# Patient Record
Sex: MALE | Race: WHITE | NOT HISPANIC OR LATINO | URBAN - METROPOLITAN AREA
[De-identification: names, ages, dates, MRNs, and addresses within clinical notes are randomized per-mention and may not be internally consistent; named-entity substitution may affect disease eponyms.]

---

## 2017-08-01 ENCOUNTER — COMMUNICATION - HEALTHEAST (OUTPATIENT)
Dept: FAMILY MEDICINE | Facility: CLINIC | Age: 47
End: 2017-08-01

## 2017-08-07 ENCOUNTER — OFFICE VISIT - HEALTHEAST (OUTPATIENT)
Dept: FAMILY MEDICINE | Facility: CLINIC | Age: 47
End: 2017-08-07

## 2017-08-07 DIAGNOSIS — R59.0 CERVICAL LYMPHADENOPATHY: ICD-10-CM

## 2017-08-07 DIAGNOSIS — B00.1 HERPES LABIALIS: ICD-10-CM

## 2017-08-15 ENCOUNTER — OFFICE VISIT - HEALTHEAST (OUTPATIENT)
Dept: FAMILY MEDICINE | Facility: CLINIC | Age: 47
End: 2017-08-15

## 2017-08-15 ENCOUNTER — HOSPITAL ENCOUNTER (OUTPATIENT)
Dept: CT IMAGING | Facility: CLINIC | Age: 47
Discharge: HOME | End: 2017-08-15
Attending: FAMILY MEDICINE

## 2017-08-15 DIAGNOSIS — N40.0 BPH (BENIGN PROSTATIC HYPERPLASIA): ICD-10-CM

## 2017-08-15 DIAGNOSIS — B00.9 HERPES SIMPLEX VIRUS (HSV) INFECTION: ICD-10-CM

## 2017-08-15 DIAGNOSIS — E78.5 HYPERLIPIDEMIA: ICD-10-CM

## 2017-08-15 DIAGNOSIS — R22.1 LOCALIZED SWELLING, MASS AND LUMP, NECK: ICD-10-CM

## 2017-08-15 DIAGNOSIS — E66.3 OVERWEIGHT: ICD-10-CM

## 2017-08-15 DIAGNOSIS — Z00.00 PHYSICAL EXAM: ICD-10-CM

## 2017-08-15 LAB
CHOLEST SERPL-MCNC: 238 MG/DL
FASTING STATUS PATIENT QL REPORTED: YES
HDLC SERPL-MCNC: 49 MG/DL
LDLC SERPL CALC-MCNC: 165 MG/DL
TRIGL SERPL-MCNC: 121 MG/DL

## 2017-08-15 RX ORDER — VALACYCLOVIR HYDROCHLORIDE 1 G/1
TABLET, FILM COATED ORAL
Qty: 30 TABLET | Refills: 1 | Status: SHIPPED | OUTPATIENT
Start: 2017-08-15

## 2017-08-15 ASSESSMENT — MIFFLIN-ST. JEOR: SCORE: 1707.88

## 2017-08-16 ENCOUNTER — COMMUNICATION - HEALTHEAST (OUTPATIENT)
Dept: FAMILY MEDICINE | Facility: CLINIC | Age: 47
End: 2017-08-16

## 2017-08-16 ENCOUNTER — AMBULATORY - HEALTHEAST (OUTPATIENT)
Dept: FAMILY MEDICINE | Facility: CLINIC | Age: 47
End: 2017-08-16

## 2017-08-16 DIAGNOSIS — R22.1 NECK MASS: ICD-10-CM

## 2017-09-21 ENCOUNTER — OFFICE VISIT - HEALTHEAST (OUTPATIENT)
Dept: OTOLARYNGOLOGY | Facility: CLINIC | Age: 47
End: 2017-09-21

## 2017-09-21 DIAGNOSIS — R59.1 LYMPHADENOPATHY: ICD-10-CM

## 2017-09-21 ASSESSMENT — MIFFLIN-ST. JEOR: SCORE: 1707.88

## 2017-09-26 ENCOUNTER — OFFICE VISIT - HEALTHEAST (OUTPATIENT)
Dept: FAMILY MEDICINE | Facility: CLINIC | Age: 47
End: 2017-09-26

## 2017-09-26 DIAGNOSIS — N40.0 BPH (BENIGN PROSTATIC HYPERPLASIA): ICD-10-CM

## 2017-09-26 DIAGNOSIS — Z01.818 PREOP EXAMINATION: ICD-10-CM

## 2017-09-26 DIAGNOSIS — E78.5 HYPERLIPIDEMIA: ICD-10-CM

## 2017-09-26 ASSESSMENT — MIFFLIN-ST. JEOR: SCORE: 1752.11

## 2017-10-23 ENCOUNTER — RECORDS - HEALTHEAST (OUTPATIENT)
Dept: ADMINISTRATIVE | Facility: OTHER | Age: 47
End: 2017-10-23

## 2017-10-23 ENCOUNTER — SURGERY - HEALTHEAST (OUTPATIENT)
Dept: SURGERY | Facility: CLINIC | Age: 47
End: 2017-10-23

## 2017-10-23 ENCOUNTER — ANESTHESIA - HEALTHEAST (OUTPATIENT)
Dept: SURGERY | Facility: CLINIC | Age: 47
End: 2017-10-23

## 2017-10-23 ASSESSMENT — MIFFLIN-ST. JEOR: SCORE: 1705.39

## 2017-10-26 ENCOUNTER — COMMUNICATION - HEALTHEAST (OUTPATIENT)
Dept: OTOLARYNGOLOGY | Facility: CLINIC | Age: 47
End: 2017-10-26

## 2017-10-26 DIAGNOSIS — C77.0 METASTASIS TO CERVICAL LYMPH NODE (H): ICD-10-CM

## 2017-11-01 ENCOUNTER — HOSPITAL ENCOUNTER (OUTPATIENT)
Dept: PET IMAGING | Facility: HOSPITAL | Age: 47
Discharge: HOME | End: 2017-11-01
Attending: OTOLARYNGOLOGY

## 2017-11-01 DIAGNOSIS — C77.0 METASTASIS TO CERVICAL LYMPH NODE (H): ICD-10-CM

## 2017-11-02 ENCOUNTER — HOSPITAL ENCOUNTER (OUTPATIENT)
Dept: PET IMAGING | Facility: HOSPITAL | Age: 47
Setting detail: RADIATION/ONCOLOGY SERIES
Discharge: STILL ADMITTED OR ADMINISTRATIVE | End: 2017-11-02
Attending: OTOLARYNGOLOGY

## 2017-11-02 ENCOUNTER — COMMUNICATION - HEALTHEAST (OUTPATIENT)
Dept: OTOLARYNGOLOGY | Facility: CLINIC | Age: 47
End: 2017-11-02

## 2017-11-02 DIAGNOSIS — C09.9 SQUAMOUS CELL CARCINOMA OF LEFT TONSIL (H): ICD-10-CM

## 2017-11-02 ASSESSMENT — MIFFLIN-ST. JEOR: SCORE: 1733.96

## 2017-11-03 ENCOUNTER — COMMUNICATION - HEALTHEAST (OUTPATIENT)
Dept: ONCOLOGY | Facility: HOSPITAL | Age: 47
End: 2017-11-03

## 2017-11-06 ENCOUNTER — COMMUNICATION - HEALTHEAST (OUTPATIENT)
Dept: ONCOLOGY | Facility: HOSPITAL | Age: 47
End: 2017-11-06

## 2017-11-14 ENCOUNTER — AMBULATORY - HEALTHEAST (OUTPATIENT)
Dept: ONCOLOGY | Facility: HOSPITAL | Age: 47
End: 2017-11-14

## 2017-11-14 ENCOUNTER — AMBULATORY - HEALTHEAST (OUTPATIENT)
Dept: INFUSION THERAPY | Facility: HOSPITAL | Age: 47
End: 2017-11-14

## 2017-11-14 ENCOUNTER — OFFICE VISIT - HEALTHEAST (OUTPATIENT)
Dept: ONCOLOGY | Facility: HOSPITAL | Age: 47
End: 2017-11-14

## 2017-11-14 DIAGNOSIS — C09.9 TONSIL CANCER (H): ICD-10-CM

## 2017-11-14 ASSESSMENT — MIFFLIN-ST. JEOR: SCORE: 1726.02

## 2017-11-16 ENCOUNTER — COMMUNICATION - HEALTHEAST (OUTPATIENT)
Dept: ONCOLOGY | Facility: HOSPITAL | Age: 47
End: 2017-11-16

## 2017-11-28 ENCOUNTER — COMMUNICATION - HEALTHEAST (OUTPATIENT)
Dept: ADMINISTRATIVE | Facility: HOSPITAL | Age: 47
End: 2017-11-28

## 2021-05-31 VITALS — HEIGHT: 73 IN | WEIGHT: 179 LBS | BODY MASS INDEX: 23.72 KG/M2

## 2021-05-31 VITALS — WEIGHT: 176.7 LBS | BODY MASS INDEX: 23.42 KG/M2 | HEIGHT: 73 IN

## 2021-05-31 VITALS — BODY MASS INDEX: 23.72 KG/M2 | HEIGHT: 73 IN | WEIGHT: 179 LBS

## 2021-05-31 VITALS — WEIGHT: 187 LBS | BODY MASS INDEX: 24.78 KG/M2 | HEIGHT: 73 IN

## 2021-05-31 VITALS — WEIGHT: 183 LBS | HEIGHT: 73 IN | BODY MASS INDEX: 24.25 KG/M2

## 2021-05-31 VITALS — WEIGHT: 184 LBS

## 2021-05-31 VITALS — BODY MASS INDEX: 24.25 KG/M2 | WEIGHT: 183 LBS | HEIGHT: 73 IN

## 2021-06-02 ENCOUNTER — RECORDS - HEALTHEAST (OUTPATIENT)
Dept: ADMINISTRATIVE | Facility: CLINIC | Age: 51
End: 2021-06-02

## 2021-06-12 NOTE — PROGRESS NOTES
ASSESSMENT:  1. Physical exam  Patient overall has good health habits.    2. Hyperlipidemia  Mild elevation of cholesterol  - Lipid Cascade  - Comprehensive Metabolic Panel    3. BPH (benign prostatic hyperplasia)  Nocturia, symptoms stable, watchful waiting.    4. Herpes Simplex  Occasional cold sore     5. Localized swelling, mass and lump, neck  8 months of intermittent recurrent swelling left lateral neck, asymptomatic.  Etiologies include a lipoma benign cyst lymph node among many other possibilities.  - CT Soft Tissue Neck With Contrast; Future      PLAN:  1.  CT scan soft tissue neck and proceed accordingly.  2.  Laboratory studies as above.  3.  Valtrex renew  4.  Focus as much as possible in good diet and exercise.  5.  Patient should otherwise be seen yearly.    Orders Placed This Encounter   Procedures     CT Soft Tissue Neck With Contrast     Standing Status:   Future     Standing Expiration Date:   8/16/2018     Scheduling Instructions:      SCHEDULING: Will patient schedule appointment? Yes     LOCATION: [facility]                  LOCATIONS:        - [facility] ([facility]), PHONE: [phone]      - [facility] ([facility]), PHONE: [phone]     Order Specific Question:   Can the procedure be changed per Radiologist protocol?     Answer:   Yes     Lipid Cascade     Order Specific Question:   Fasting is required?     Answer:   Yes     Comprehensive Metabolic Panel     Medications Discontinued During This Encounter   Medication Reason     valACYclovir (VALTREX) 1000 MG tablet Reorder       No Follow-up on file.    CHIEF COMPLAINT:  Chief Complaint   Patient presents with     Annual Exam     pt is fasting - talk about results of swollen glands        SUBJECTIVE:  Alistair is a 46 y.o. male who comes in for physical examination.  Since about December of last year the patient has noticed a swelling of the left lateral aspect of the neck is sometimes seems to change in size but  never goes away, does not cause any symptoms he otherwise is feeling well energy level is good no trouble swallowing, no hayfever allergy symptoms no congestion.  He was seen in walk-in clinic for this recently, CBC was within normal limits.  I discussed with the patient that this certainly could be benign however we do need to further explore this with imaging.    He has a history of underlying herpes simplex with occasional cold sores takes Valtrex as needed.  BPH symptoms of nocturia but this is been overall stable and has not changed over time.  Patient has mild hyperlipidemia but this is been stable attempting to monitor with lifestyle.    Patient's past medical history past surgical history social history and family history reviewed.    REVIEW OF SYSTEMS:   Patient rarely gets sick no stomach problems no bowel issues occasional stiffness and soreness some joints but no significant issues  All other systems are negative.    PFSH:  Immunization History   Administered Date(s) Administered     Tdap 07/23/2010     ZOSTER 11/19/2013     Social History     Social History     Marital status: Single     Spouse name: N/A     Number of children: 1     Years of education: N/A     Occupational History     Sales      Refrigeration     Social History Main Topics     Smoking status: Former Smoker     Types: Cigarettes     Quit date: 1/15/2017     Smokeless tobacco: Never Used      Comment: light smoker     Alcohol use Yes      Comment: rare now, more in past     Drug use: No     Sexual activity: Yes     Partners: Female     Other Topics Concern     Not on file     Social History Narrative    Diet-        Exercise-yard work     History reviewed. No pertinent past medical history.  Family History   Problem Relation Age of Onset     Benign prostatic hyperplasia Father      Lung cancer Mother 70     smoked     Stroke Brother 45       MEDICATIONS:  Current Outpatient Prescriptions   Medication Sig Dispense Refill     valACYclovir  "(VALTREX) 1000 MG tablet Take one tablet twice for three days for cold sores 30 tablet 1     No current facility-administered medications for this visit.        TOBACCO USE:  History   Smoking Status     Former Smoker     Types: Cigarettes     Quit date: 1/15/2017   Smokeless Tobacco     Never Used     Comment: light smoker       VITALS:  Vitals:    08/15/17 1123   BP: 114/66   Pulse: 70   Weight: 179 lb (81.2 kg)   Height: 6' 0.5\" (1.842 m)     Wt Readings from Last 3 Encounters:   08/15/17 179 lb (81.2 kg)   08/07/17 184 lb (83.5 kg)   09/15/15 185 lb (83.9 kg)       PHYSICAL EXAM:  Constitutional:   Reveals a male who appears healthy.  Vitals: per nursing notes.  HEENT: Right Ear: External ear normal.   Left Ear: External ear normal.   Nose: Nose normal.   Mouth/Throat: Oropharynx is clear and moist.   Eyes: Conjunctivae and EOM are normal. Pupils are equal, round, and reactive to light. Right eye exhibits no discharge. Left eye exhibits no discharge.   Neck:  Supple, no carotid bruits or adenopathy.  Back:  No spine or CVA pain.  Thorax:  No bony deformities.  Lungs: Clear to A&P without rales or wheezes.  Respiratory effort normal.  Cardiac:   Regular rate and rhythm, normal S1, S2, no murmur or gallop.  Abdomen:  Soft, active bowel sounds without bruits, mass, or tenderness.  Extremities:   No peripheral edema, pulses in the feet intact.    Skin:  No jaundice, peripheral cyanosis or lesions to suggest malignancy.  Neuro:  Alert and oriented. Cranial nerves, motor, sensory exams are intact.  No gross focal deficits.  Psychiatric:  Memory intact, mood appropriate.    QUALITY MEASURES:        DATA REVIEWED:  Walk-in care visit and CBC reviewed from August  "

## 2021-06-13 NOTE — ANESTHESIA PREPROCEDURE EVALUATION
Anesthesia Evaluation      Patient summary reviewed     Airway   Mallampati: II   Pulmonary - normal exam                          Cardiovascular   Rhythm: regular  Rate: normal,         Neuro/Psych      Endo/Other       GI/Hepatic/Renal            Dental - normal exam                        Anesthesia Plan  Planned anesthetic: general endotracheal  GLIDESCOPE  DEC 10 ZOF 4  ASA 2     Anesthetic plan and risks discussed with: patient  Anesthesia plan special considerations: antiemetics,   Post-op plan: routine recovery

## 2021-06-13 NOTE — PROGRESS NOTES
HPI: This patient is a 46yo M who presents to clinic for evaluation of a neck mass at the request of Dr. Jalloh. It has been present on the right side for about 6 months or so now. The patient has noticed slow growth, but no pain. Denies fevers, weight loss, odynophagia, dysphagia, malaise, myalgias, hemoptysis, shortness of breath, voice issues.     Past medical history, surgical history, social history, family history, medications, and allergies have been reviewed with the patient and are documented above.    Review of Systems: a 10-system review was performed. Pertinent positives are noted in the HPI and on a separate scanned document in the chart.    PHYSICAL EXAMINATION:  GEN: no acute distress, normocephalic  EYES: extraocular movements are intact, pupils are equal and round. Sclera clear.   EARS: auricles are normally formed. The external auditory canals are clear with minimal to no cerumen. Tympanic membranes are intact bilaterally with no signs of infection, effusion, retractions, or perforations.  NOSE: anterior nares are patent. There are no masses or lesions. The septum is non-obstructing.  OC/OP: clear, dentition is in good repair. The tongue and palate are fully mobile and symmetric. The floor of mouth, base of tongue, and tonsils are soft and symmetric.  HP/L (scope): nasopharynx, base of tongue, vallecula, epiglottis, and bilateral pyriform sinuses are clear. The bilateral vocal folds are mobile and without lesion. No masses or lesions seen.  NECK: soft and supple.  Airway is midline. ~4cm soft, mobile mass in the right level II neck  NEURO: CN II-XII are intact bilaterally. alert and oriented. No nystagmus. Gait is normal.  PULM: breathing comfortably on room air, normal chest expansion with respiration  HEART: regular rate and rhythm, no peripheral edema    CT NECK:  IMPRESSION:   CONCLUSION:   1.  Deep to the marker indicating the palpable left neck mass is a soft tissue attenuating mass measuring  up to 3.9 cm in diameter. This most likely reflects an enlarged cervical lymph node. This is in a location that would be amenable to ultrasound-guided FNA, which is recommended.     2.  Adjacent to the above-described abnormally enlarged presumed left cervical lymph node are multiple prominent/mildly enlarged left cervical lymph nodes. Given their proximity to the abnormally enlarged lymph node, these are suspicious for disease involvement.     3.  Asymmetric soft tissue fullness/thickening partially effacing the left piriform sinus. Given the presence of left-sided cervical lymphadenopathy, this brings up concern for a possible mucosal lesion. ENT consultation with consideration for endoscopic evaluation is advised.    MEDICAL DECISION-MAKING: This patient is a 46yo M with right neck lymphadenopathy. The differential includes lymphoma, other malignancy, Castleman's disease, reactivity. At this size, and with other surrounding lymph nodes, a tissue diagnosis is imperative. Discussed limitations, risks, and benefits of both FNA and excisional biopsy. He would like to proceed with an excisional biopsy. Will schedule expeditiously.

## 2021-06-13 NOTE — ANESTHESIA POSTPROCEDURE EVALUATION
Patient: Alistair Bruce  LEFT NECK EXCSIONAL LYMPH NODE BIOPSY  Anesthesia type: general    Patient location: PACU  Last vitals:   Vitals:    10/23/17 0957   BP: 129/79   Pulse: 75   Resp: 17   Temp:    SpO2: 99%     Post vital signs: stable  Level of consciousness: awake and responds to simple questions  Post-anesthesia pain: pain controlled  Post-anesthesia nausea and vomiting: no  Pulmonary: unassisted, return to baseline  Cardiovascular: stable and blood pressure at baseline  Hydration: adequate  Anesthetic events: no    QCDR Measures:  ASA# 11 - Veronica-op Cardiac Arrest: ASA11B - Patient did NOT experience unanticipated cardiac arrest  ASA# 12 - Veronica-op Mortality Rate: ASA12B - Patient did NOT die  ASA# 13 - PACU Re-Intubation Rate: ASA13B - Patient did NOT require a new airway mgmt  ASA# 10 - Composite Anes Safety: ASA10A - No serious adverse event    Additional Notes:

## 2021-06-13 NOTE — ANESTHESIA CARE TRANSFER NOTE
Last vitals:   Vitals:    10/23/17 0852   BP: 98/56   Pulse: 64   Resp: 8   Temp: 36.5  C (97.7  F)   SpO2: 99%     Patient's level of consciousness is drowsy  Spontaneous respirations: yes  Maintains airway independently: yes  Dentition unchanged: yes  Oropharynx: oropharynx clear of all foreign objects    QCDR Measures:  ASA# 20 - Surgical Safety Checklist: WHO surgical safety checklist completed prior to induction  PQRS# 430 - Adult PONV Prevention: 4558F - Pt received => 2 anti-emetic agents (different classes) preop & intraop  ASA# 8 - Peds PONV Prevention: NA - Not pediatric patient, not GA or 2 or more risk factors NOT present  PQRS# 424 - Veronica-op Temp Management: 4559F - At least one body temp DOCUMENTED => 35.5C or 95.9F within required timeframe  PQRS# 426 - PACU Transfer Protocol: - Transfer of care checklist used  ASA# 14 - Acute Post-op Pain: ASA14B - Patient did NOT experience pain >= 7 out of 10

## 2021-06-13 NOTE — PROGRESS NOTES
ASSESSMENT:  1. Preop examination  For surgical excision of a left lateral neck mass, suspicious that this is a benign lymph node.     PLAN:  1.  Comprehensive metabolic profile from August 16 reviewed entirely within normal limits, and CBC from August 7 reviewed, normal white count of 7000 and normal hemoglobin of 15.  2.  Patient is otherwise cleared for surgery.    No orders of the defined types were placed in this encounter.    There are no discontinued medications.    No Follow-up on file.    Patient approved for surgery with general or local anesthesia.     CHIEF COMPLAINT:  Chief Complaint   Patient presents with     Pre-op Exam     NEEDS: flu vacc        Subjective:     Scheduled Procedure: left neck excisional lymph node biopsy   Surgery Date: 10-3-17  Surgery Location: [facility]   Surgeon:  Dr. Vega   Recovery Plan: going home after surgery     Alistair is a 47 y.o. male here for an internal medicine pre-operative consultation. The exam is requested by Dr. Vega in preparation for left neck excisional lymph node biopsy to be performed at [facility] on 10/3/17. Today s examination on 9/26/2017 is done to review the underlying surgical condition of patient comorbidities, clear for anesthesia, and review medical problems with appropriate changes in medications. He was seen this past August with a mass in the left side of his neck. He notes that it is not painful, and does not feel like it has changed in size. A CT scan revealed that it is an enlarged lymph node.     Alistair has tolerated previous surgeries well without bleeding or anesthesia difficulty.    Health Maintenance: He declines the influenza vaccine, as the last time he got one he got sick.    Current Outpatient Prescriptions   Medication Sig Dispense Refill     valACYclovir (VALTREX) 1000 MG tablet Take one tablet twice for three days for cold sores 30 tablet 1     No current facility-administered medications for this visit.       Allergies   Allergen Reactions     Penicillins Rash     Annotation: 1 years old  rash  doubt sitil allergic       Immunization History   Administered Date(s) Administered     Tdap 07/23/2010     ZOSTER 11/19/2013       Patient Active Problem List   Diagnosis     Herpes Simplex     Hyperlipidemia     Overweight     BPH (benign prostatic hyperplasia)       Social History     Social History     Marital status: Single     Spouse name: N/A     Number of children: 1     Years of education: N/A     Occupational History     Sales      Refrigeration     Social History Main Topics     Smoking status: Former Smoker     Types: Cigarettes     Quit date: 1/15/2017     Smokeless tobacco: Never Used      Comment: light smoker     Alcohol use Yes      Comment: rare now, more in past     Drug use: No     Sexual activity: Yes     Partners: Female     Other Topics Concern     Not on file     Social History Narrative    Diet-        Exercise-yard work       Past Surgical History:   Procedure Laterality Date     APPENDECTOMY       Performed laparoscopically       Family History   Problem Relation Age of Onset     Benign prostatic hyperplasia Father      Lung cancer Mother 70     smoked     Stroke Brother 45     History of Present Illness  Recent Health  Fever: no  Chills: no  Fatigue: no  Chest Pain: no  Cough: no  Dyspnea: no  Urinary Frequency: no  Nausea:no   Vomiting: no  Diarrhea: no  Abdominal Pain: no  Easy Bruising: no  Lower Extremity Swelling:no   Poor Exercise Tolerance:no     Pertinent History  Prior Anesthesia: yes  Previous Anesthesia Reaction:  no  Diabetes:no     Cardiovascular Disease:no   Pulmonary Disease: no  Renal Disease:no   GI Disease: no  Sleep Apnea: no  Thromboembolic Problems: no  Clotting Disorder:no   Bleeding Disorder:no   Transfusion Reaction: no  Impaired Immunity:no    Steroid use in the last 6 months: no   Frequent Aspirin use: no    Review of Systems  Review of Systems   Constitutional:  Negative  HENT: Negative  Eyes:Negative   Respiratory:  Negative   Cardiovascular: Negative  Gastrointestinal:  Negative    Endocrine: Negative   Genitourinary:  Negative  Musculoskeletal: Negative  Skin: Negative   Neurological: Negative   Hematological:  Negative  Psychiatric/Behavioral: Negative      Objective:      Vitals:    09/26/17 1501   BP: 122/82   Pulse: 86   Resp: 10   Temp: 98.3  F (36.8  C)   SpO2: 97%     Wt Readings from Last 3 Encounters:   09/26/17 187 lb (84.8 kg)   09/21/17 179 lb (81.2 kg)   08/15/17 179 lb (81.2 kg)     BP Readings from Last 3 Encounters:   09/26/17 122/82   08/15/17 114/66   08/07/17 138/72     Body mass index is 24.67 kg/(m^2).      Physical Exam:  Constitutional: he appears well-developed and well-nourished.   HENT:   Right Ear: External ear normal.   Left Ear: External ear normal.   Nose: Nose normal.   Mouth/Throat: Oropharynx is clear and moist.   Eyes: Conjunctivae and EOM are normal. Pupils are equal, round, and reactive to light. Right eye exhibits no discharge. Left eye exhibits no discharge.   Neck: Golf ball size swelling under left jaw, soft.   Cardiovascular: Normal rate, regular rhythm and normal heart sounds. No murmur heard.  Pulmonary/Chest: Effort normal and breath sounds normal.   Abdominal: Soft. Bowel sounds are normal. He exhibits no distension and no mass. There is no tenderness. There is no rebound and no guarding.   Genitourinary (Males): Right testis is descended. Left testis is descended.  Musculoskeletal: Normal range of motion.   No joint swelling or deformity.   Lymphadenopathy:     He has no cervical adenopathy.   Neurological: he is alert. he has normal reflexes.   Skin: Skin is warm and dry. No rash noted.   Psychiatric: he has a normal mood and affect.       DATA REVIEWED:  Additional History from Old Records Summarized (2): Reviewed ENT note 9/21/17.   Decision to Obtain Records (1): None.  Radiology Tests Summarized or Ordered (1): Reviewed  CT 8/15/17; large cervical lymph node.  Labs Reviewed or Ordered (1): Reviewed labs 8/15/17; comprehensive metabolic panel, lipid cascade.   Medicine Test Summarized or Ordered (1): None.  Independent Review of EKG, X-RAY, or RAPID STREP (2 each): None.    The visit lasted a total of 8 minutes face to face with the patient. Over 50% of the time was spent counseling and educating the patient about preparation for surgery.    I, Yaya Osman, am scribing for and in the presence of, Dr. Jalloh.    I, Dr. Jalloh, personally performed the services described in this documentation, as scribed by aYya Osman in my presence, and it is both accurate and complete.    Total data points: 4

## 2021-06-14 NOTE — PROGRESS NOTES
"I met with Kwame and his \"wife\" (never , but together for 13 years, son Like is 12).  I explained my role to them and encouraged them to call anytime.  They both have my business card.    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  I asked if he could go to [facility] on Monday, 11-20 instead, but he prefers to come on Tuesday.  I encouraged him to bring his laptop and or other things to keep him busy.  I explained Dr. 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  He is ok with this and states he has a lot to research (chemotherapy).  He stated Dr. Chen explained a lot including some Radiation information so he feels he will not need to have a long consult.    "

## 2021-06-14 NOTE — CONSULTS
[facility] Hematology and Oncology Consult Note    Patient: Alistair Bruce  MRN: [id number]  Date of Service: 11/14/2017      Reason for Visit:    1.  Newly diagnosed tonsil cancer    Assessment/Plan:    1.  Squamous cell carcinoma of the left tonsil: Reviewed the pathology with the patient today.  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  Tumor is also positive for P 16 immunohistochemistry suggesting relation to HPV infection.  I also reviewed with the patient is PET/CT images.  The patient has been referred for definitive concurrent chemotherapy and radiation.  The rationale for this treatment was reviewed with the patient.  We talked about the schedule and potential side effects of this therapy which include, but are not limited to, hair loss, fatigue, renal dysfunction, peripheral neuropathy, and cytopenias.  He has some concerns about the toxicity.  We talked about treatment with only radiation and the likely decreased effectiveness this would provide.  I reviewed clinical trials we have available and the only one he would qualify for requires upfront resection.  He had many questions about possible treatment options.  He practices other self-healing type methods which he plans to incorporate into his treatment strategy.  I told him that cetuximab would be an option instead of cisplatin but, at this point in time, cisplatin is still the standard of care.  He will meet with radiation oncology next week.  All of his questions today were answered.    ECOG Performance   ECOG Performance Status: 0    Distress Assessment  Distress Assessment Score: 2 (this appt)    Problem List:    1. Tonsil cancer  HM1(CBC and Differential)    Comprehensive Metabolic Panel     Staging History:    No matching staging information was found for the patient.    History:    Alistair is a 47-year-old gentleman who presents with a newly diagnosed left-sided tonsil cancer.  The patient noted a mass on the left side of his  neck approximately 3-4 months or so.  It was slowly enlarging.  He was not having any discomfort or pain.  No odynophagia or dysphasia.  No cough, chest pain, or shortness of breath.  He was not having any unintentional weight loss.  He saw his primary care provider and was referred to ENT.  He had a biopsy of the mass on October 23.  This showed a basaloid squamous cell carcinoma.  He is now being referred for definitive treatment.  Continues to feel well.  No new complaints.  Continues to have minimal discomfort.  No limitations in what he can eat or drink.  He has had a PET scan performed which suggested a left tonsil primary.    Past History:    Past Medical History:   Diagnosis Date     BPH (benign prostatic hyperplasia)      HLD (hyperlipidemia)     Family History   Problem Relation Age of Onset     Benign prostatic hyperplasia Father      Lung cancer Mother 70     smoked     Stroke Brother 45      [unfilled] Social History     Social History     Marital status: Single     Spouse name: N/A     Number of children: 1     Years of education: N/A     Occupational History     Sales      Refrigeration     Social History Main Topics     Smoking status: Never Smoker     Smokeless tobacco: Never Used     Alcohol use No     Drug use: No     Sexual activity: No     Other Topics Concern     Not on file     Social History Narrative    Diet-        Exercise-yard work        Allergies:    Allergies   Allergen Reactions     Penicillins Rash     Annotation: 1 years old  rash  doubt sitil allergic       Review of Systems:    General  General (WDL): All general elements are within defined limits  ENT  ENT (WDL): All ENT elements are within defined limits  Respiratory  Respiratory (WDL): All respiratory elements are within defined limits  Cardiovascular  Cardiovascular (WDL): All cardiovascular elements are within defined limits  Endocrine  Endocrine (WDL): All endocrine elements are within defined  "limits  Gastrointestinal  Gastrointestinal (WDL): All gastrointestinal elements are within defined limits  Musculoskeletal  Musculoskeletal (WDL): All musculoskeletal elements are within defined limits  Neurological  Neurological (WDL): All neurological elements are within defined limits  Dominant Hand: Right  Psychological/Emotional  Psychological/Emotional (WDL): All psychological/emotional elements are within defined limits  Hematological/Lymphatic  Hematological/Lymphatic (WDL): All hematological/lymphatic elements are within defined limits  Dermatological  Dermatologic (WDL): All dermatological elements are within defined limits  Genitourinary/Reproductive  Genitourinary/Reproductive (WDL): All genitourinary/reproductive elements are within defined limits  Reproductive (Females only)     Pain  Currently in Pain: No/denies    Physical Exam:    Recent Vitals 11/14/2017   Height 6' 0.5\"   Weight 183 lbs   BSA (m2) 2.06 m2   /86   Pulse 87   Temp 98   Temp src 1   SpO2 97   Some recent data might be hidden     General: patient appears stated age of 47 y.o.. Nontoxic and in no distress.   HEENT: Head: atraumatic, normocephalic. Sclerae anicteric.  Chest:  Normal respiratory effort  Cardiac:  No edema.   Abdomen: abdomen is non-distended  Extremities: normal tone and muscle bulk.  Skin: no lesions or rash. Warm and dry.   CNS: alert and oriented x3. Grossly non-focal.   Psychiatric: normal mood and affect.     Lab Results:    Results for RAMSES RUIZ (MRN [id number]) as of 11/27/2017 08:40   Ref. Range 11/14/2017 10:30   Sodium Latest Ref Range: 136 - 145 mmol/L 140   Potassium Latest Ref Range: 3.5 - 5.0 mmol/L 4.2   Chloride Latest Ref Range: 98 - 107 mmol/L 104   CO2 Latest Ref Range: 22 - 31 mmol/L 30   Anion Gap, Calculation Latest Ref Range: 5 - 18 mmol/L 6   BUN Latest Ref Range: 8 - 22 mg/dL 13   Creatinine Latest Ref Range: 0.70 - 1.30 mg/dL 0.96   GFR MDRD Af Amer Latest Ref Range: >60 " mL/min/1.73m2 >60   GFR MDRD Non Af Amer Latest Ref Range: >60 mL/min/1.73m2 >60   Calcium Latest Ref Range: 8.5 - 10.5 mg/dL 9.4   AST Latest Ref Range: 0 - 40 U/L 18   ALT Latest Ref Range: 0 - 45 U/L 19   ALBUMIN Latest Ref Range: 3.5 - 5.0 g/dL 4.0   Protein, Total Latest Ref Range: 6.0 - 8.0 g/dL 7.2   Alkaline Phosphatase Latest Ref Range: 45 - 120 U/L 49   Bilirubin, Total Latest Ref Range: 0.0 - 1.0 mg/dL 0.4   Results for RAMSES RUIZ (MRN [id number]) as of 11/27/2017 08:40   Ref. Range 11/14/2017 10:30   WBC Latest Ref Range: 4.0 - 11.0 thou/uL 5.5   RBC Latest Ref Range: 4.40 - 6.20 mill/uL 5.46   Hemoglobin Latest Ref Range: 14.0 - 18.0 g/dL 15.0   Hematocrit Latest Ref Range: 40.0 - 54.0 % 44.7   MCV Latest Ref Range: 80 - 100 fL 82   MCH Latest Ref Range: 27.0 - 34.0 pg 27.5   MCHC Latest Ref Range: 32.0 - 36.0 g/dL 33.6   RDW Latest Ref Range: 11.0 - 14.5 % 13.2   Platelets Latest Ref Range: 140 - 440 thou/uL 235   MPV Latest Ref Range: 8.5 - 12.5 fL 10.2   Neutrophils % Latest Ref Range: 50 - 70 % 64   Lymphocytes % Latest Ref Range: 20 - 40 % 26   Monocytes % Latest Ref Range: 2 - 10 % 10   Eosinophils % Latest Ref Range: 0 - 6 % 0   Basophils % Latest Ref Range: 0 - 2 % 0   Neutrophils Absolute Latest Ref Range: 2.0 - 7.7 thou/uL 3.6   Lymphocytes Absolute Latest Ref Range: 0.8 - 4.4 thou/uL 1.4   Monocytes Absolute Latest Ref Range: 0.0 - 0.9 thou/uL 0.5   Eosinophils Absolute Latest Ref Range: 0.0 - 0.4 thou/uL 0.0   Basophils Absolute Latest Ref Range: 0.0 - 0.2 thou/uL 0.0     Imaging Results:    PET/CT images were personally reviewed.  This shows a tonsillar lesion and some left neck adenopathy.    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 Post excisional biopsy 10/23/2017. Unknown primary lesion. Initial treatment  strategy. TECHNIQUE: Serum glucose level 100 mg/dL. One hour post intravenous administration of 11.0 mCi F-18 FDG, PET imaging was performed from the skull base to the mid thighs utilizing attenuation correction with concurrent axial CT and PET/CT image fusion. Dose reduction techniques were used. COMPARISON: CT neck 08/15/2017. FINDINGS: Asymmetric moderate focal uptake (SUVmax 4.8) in the left palatine tonsil measuring approximately 1.7 cm suggests site of primary malignancy. Interval postoperative changes since 08/15/2017 of left upper cervical lymphadenopathy with ill-defined stranding and low-level inflammatory uptake. Small focus of gas in the surgical bed is presumably postoperative. A mildly enlarged left upper cervical level II/III node deep to the sternomastoid muscle remains measuring 0.7 x 1.5 cm (axial fused image 174) associated with mild uptake (SUVmax 2.8), suspicious for a kb metastasis. Normal physiologic FDG uptake elsewhere. No evidence of distant metastasis. Mild colonic diverticulosis. Minimal atherosclerotic calcifications. Mild scattered degenerative changes in the spine.     CONCLUSION: Findings suggest primary left tonsillar malignancy with a residual left upper cervical kb metastasis. No evidence of distant metastasis.    Pathology:    Final Diagnosis   LEFT NECK MASS, BIOPSY     - BASALOID SQUAMOUS CELL CARCINOMA     - UNREMARKABLE FLOW CYTOMETRY ([id number])     MCSS   Electronically signed by Antoni Calloway MD on 10/25/2017 at 1447   Comment        The clinical history has been reviewed. Histologic sections appear to demonstrate a lymph node involved by metastatic basaloid squamous cell carcinoma; however, given the prominent lymphoid component, PCR studies (B-cell and T-cell) can be performed on the current specimen, if requested. Case reviewed with Dr. Garland Isaac, who agrees. Clinical correlation is suggested.     Histologic sections demonstrate basaloid squamous cell  carcinoma present in a prominent trabecular infiltrative pattern. The tumor cells are positive for EAN pankeratin, p63, p16, and [id number], confirming squamous cell differentiation. The positive p16 immunohistochemistry suggests that this tumor may be HPV related. The tumor cells are positive for Ki67 (60-70%).     The morphologic differential diagnosis is broad, and additional stains were performed. CD3(+) T cells predominant with scattered CD20(+) B cells. CD56 demonstrates no significant evidence of neuroendocrine differentiation. The lack of staining for  and CD5 argues against thymic carcinoma. TTf-1 is negative, arguing against lung and thyroid differentiation. EBV in situ hybridization studies are probably negative with high background. CD21 highlights residual follicular dendritic cell meshworks and argues against dendritic cell neoplasms. The tumor cells are negative for bcl-6, bcl-2, and cyclin-D1, markers often seen in lymphoma.       Signed by: Darryn Chen MD

## 2021-06-16 PROBLEM — C09.9 TONSIL CANCER (H): Status: ACTIVE | Noted: 2017-11-27

## 2021-06-25 NOTE — PROGRESS NOTES
Progress Notes by Erich Lancaster DO at 8/7/2017  4:30 PM     Author: Erich Lancaster DO Service: -- Author Type: Physician    Filed: 8/8/2017  8:33 AM Encounter Date: 8/7/2017 Status: Signed    : Erich Lancaster DO (Physician)       Chief Complaint   Patient presents with   ? poss cold sore     refill of valtrex. Pt. feels a cold sore coming on. Would like about 20 tabs   ? check lymphnodes     on and off for 2 months. Left side of neck feels swollen     History of Present Illness: Nursing notes reviewed. Patient has two concerns. He would like a refill of his valacyclovir, which can be dosed at 2000 mg bid for one day for treatment of recurrent cold sores. He has also noted what appears to be enlarged lymph nodes in left side of neck that tend to get bigger and smaller over several months. No clear correlation with his cold sores which he states are less common after getting a vaccination for varicella. At exam patient has tingling in lower lip suggestive of an upcoming cold sore.     Review of systems: See history of present illness, otherwise negative.     Current Outpatient Prescriptions   Medication Sig Dispense Refill   ? valACYclovir (VALTREX) 1000 MG tablet      ? valACYclovir (VALTREX) 1000 MG tablet Take 2 tablets (2,000 mg total) by mouth 2 (two) times a day for 1 day. 32 tablet 0     No current facility-administered medications for this visit.        No past medical history on file.   Past Surgical History:   Procedure Laterality Date   ? ID LAP,APPENDECTOMY      Description: Laparoscopy Appendectomy;  Recorded: 12/23/2008;      Social History     Social History   ? Marital status: Single     Spouse name: N/A   ? Number of children: N/A   ? 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 Smoking status: Former Smoker   ? Smokeless tobacco: None   ? Alcohol use Yes   ? Drug use: No   ? Sexual activity: Yes     Partners: Female     Other Topics Concern    ? None     Social History Narrative    Diet-        Exercise-       History   Smoking Status   ? Former Smoker   Smokeless Tobacco   ? Not on file      Exam:   Blood pressure 138/72, pulse 84, temperature 97.8  F (36.6  C), temperature source Oral, resp. rate 14, weight 184 lb (83.5 kg), SpO2 98 %.    EXAM:   General: Vital signs reviewed. Patient is in no acute appearing distress. Breathing is non labored appearing. Patient is alert and oriented x 3.   ENT: Tympanic membranes are clear and without injection bilaterally, nasal turbinates show no injection or rhinorrhea, no pharyngeal injection or exudate. No outer oral skin abnormality, or inner oral mucosal or tongue abnormality noted.  Neck: supple with left submental adenopathy in a non-tender cluster about 3 cam diameter.  Heart: Normal rate and rhythm without murmur  Lungs: Clear to auscultation with good air flow bilaterally.  Skin: warm and dry  Recent Results (from the past 24 hour(s))   HM1 (CBC with Diff)   Result Value Ref Range    WBC 7.0 4.0 - 11.0 thou/uL    RBC 5.27 4.40 - 6.20 mill/uL    Hemoglobin 15.0 14.0 - 18.0 g/dL    Hematocrit 45.1 40.0 - 54.0 %    MCV 85 80 - 100 fL    MCH 28.4 27.0 - 34.0 pg    MCHC 33.2 32.0 - 36.0 g/dL    RDW 11.7 11.0 - 14.5 %    Platelets 226 140 - 440 thou/uL    MPV 7.9 7.0 - 10.0 fL    Neutrophils % 50 50 - 70 %    Lymphocytes % 36 20 - 40 %    Monocytes % 11 (H) 2 - 10 %    Eosinophils % 3 0 - 6 %    Basophils % 1 0 - 2 %    Neutrophils Absolute 3.5 2.0 - 7.7 thou/uL    Lymphocytes Absolute 2.5 0.8 - 4.4 thou/uL    Monocytes Absolute 0.8 0.0 - 0.9 thou/uL    Eosinophils Absolute 0.2 0.0 - 0.4 thou/uL    Basophils Absolute 0.1 0.0 - 0.2 thou/uL    Results from exam reviewed with patient.    Assessment/Plan   1. Herpes labialis  valACYclovir (VALTREX) 1000 MG tablet   2. Cervical lymphadenopathy  HM1(CBC and Differential)    HM1 (CBC with Diff)       Patient Instructions     Also see info below. If the lymph nodes get  increasingly larger and more numerous, it would be reason to have an in-depth work up done.    Understanding Cold Sores  Cold sores are small blisters or sores on the lip or sometimes inside the mouth. Many people get them from time to time. Cold sores usually are not serious, and they usually heal in a week or two. They are caused by 2 related viruses, herpes simplex type 1 and 2. These viruses spread very easily. Many people have one or both of these viruses in their body. More than 4 in every 5 people are infected with herpes simplex type 1. Once you have the virus that causes cold sores, it stays in your body for the rest of your life. But it can be inactive for long periods.  What causes a cold sore?  Cold sores are usually caused by herpes simplex virus type 1. Less often, they are caused by herpes simplex virus type 2. Herpes simplex virus type 2 is the more common cause of genital sores. The herpes viruses can enter the body through a break in the skin such as a scrape. Or they may enter through mucous membranes such as the lips or mouth. Some ways to get the viruses include:    Kissing someone who has a cold sore    Sharing a drinking glass, eating utensils, or lip balm with someone who has a cold sore    Having oral sex with someone who has a cold sore  A  baby can also get the infection at birth.  If you have a herpes virus, you can to pass it along even when you dont have a sore.  Cold sores flare up occasionally. Things that can cause an outbreak include:    Sun exposure    Fever    Stress or exhaustion    Menstruation    Skin irritation    Another unrelated Illness such as pneumonia, urinary infection, or cancer  What are the symptoms of a cold sore?  Symptoms can include:    A blister-like sore or cluster of sores. These often occur at the edge of the lips but may appear inside the mouth.    Skin redness around the sores.    Pain or itching in the area of the outbreak. Often the pain or itching  develops 12 to 24 hours before the sore become visible.    Flu-like symptoms, including swollen glands, headache, body ache, or fever. These typically occur only at the time of the first infection.  Cold sores may also occur on fingers. They may rarely infect the eyes, a serious possible complication.  Some people have symptoms a day or two before an outbreak. They may feel tenderness, burning, itching, or tingling before a cold sore appears. Cold sores tend to come back in the same area that they first appeared.  How are cold sores treated?  Treatment for cold sores focuses on relieving and shortening symptoms. For people with frequent outbreaks, treatment works to decrease how often future episodes.  Treatments may include:    Prescription or over-the-counter pain medicines. These can help with discomfort, especially if sores are inside the mouth.    Antiviral medicines. These may be pills that are taken by mouth or a cream to apply to sores. They may help shorten an outbreak and reduce the severity of symptoms.  They may be used to help prevent future outbreaks if you have disabling recurrent infections.    Self-care such as extra rest and drinking more fluids. These may help relieve the flu-like symptoms of a first outbreak.  How are cold sores diagnosed?  Your healthcare provider makes the diagnosis mainly by looking at the sores and doing a clinical exam.  This may be confirmed by swab tests or blood tests.  How can I prevent cold sores?  You can help reduce the spread of the herpes viruses that cause cold sores. This can help both you and others avoid getting cold sores. Follow these tips:    Do not kiss others if you have a cold sore. Also avoid kissing someone with a cold sore.    Do not share eating utensils, lip balm, razors, or towels with someone who has a cold sore.    Wash your hands after touching the area of a cold sore. The herpes virus can be carried from your face to your hands when you touch the  area of a cold sore. When this happens, wash your hands thoroughly, for at least 20 seconds. When you cant wash with soap and water, use an alcohol-based hand .    Disinfect things you touch often, such as phones and keyboards.      If you feel a cold sore coming on, do the same things you would do when a cold sore is present to avoid spreading the virus.    Use condoms to help prevent passing on the viruses through sex.  What are the possible complications of a cold sore?  Cold sores usually go away by themselves within 2 weeks. For most people cold sores are not serious. The viruses that cause cold sores can cause more serious illness, though. People who have a weak immune system may get more serious infections from herpes viruses. These include people being treated for cancer or who have HIV disease. Babies may also become very ill from a herpes infection.      When should I call my healthcare provider?  Call your healthcare provider right away if you have any of these:    Fever of 100.4 F (38 C) or higher, or as directed    Pain that gets worse    You cannot eat or drink because of painful sores    Symptoms dont get better within 5 to 7 days    Blisters spread beyond the mouth or lip to areas on the chest, arms, face, or legs   Date Last Reviewed: 3/28/2016    4279-3129 [employer]. [address] [place] [address], [place] [address]. All rights reserved. This information is not intended as a substitute for professional medical care. Always follow your healthcare professional's instructions.        Herpes: Caring for Sores  Good hygiene matters when you have herpes. Take care of your sores to speed healing. Neglected sores can lead to other infections.     Warm baths can ease itching and burning caused by sores.   To ease your symptoms    Take any medications as directed.    Take acetaminophen or ibuprofen for pain.    Take warm or cool baths to relieve itching of sores. And dont share towels when  you have a sore.    Urinate in a tub of warm water to prevent burning. This works for women with genital herpes.    Dont wear tight clothes or nylon underwear. They can trap moisture, cause chafing, and prevent sores from healing.  To speed healing    Wash the sores with mild soap and water. And wash your hands after you touch a sore.    Dry the affected area completely.    Dont bandage sores. The dry air helps them heal.    Avoid ointments unless they are prescribed. They retain moisture and may cause other infections.    Dont pick at the sores. This can slow healing.    Dont touch your eyes when you have a sore. The virus may travel from your fingertips to your eyes.  Resources  [employer] STD Hotline  665.836.5511  www.ashastd.org  Centers for Disease Control and Prevention  833.599.1342  www.cdc.gov/std   Date Last Reviewed: 10/28/2014    9842-9818 [employer]. [address] [place] [address]. All rights reserved. This information is not intended as a substitute for professional medical care. Always follow your healthcare professional's instructions.        Lymphadenopathy  Lymphadenopathy is swelling of the lymph nodes. Lymph nodes are small, bean-shaped glands around the body.  What are lymph nodes?  Lymph nodes are part of your immune system. The glands are found in your neck, armpits, groin, chest, and abdomen. They act as filters for lymph fluid as it flows through your body. Lymph fluid contains white blood cells and other things that fight infection.  Why lymph nodes swell  Lymphadenopathy is very common. The glands often enlarge during a viral or bacterial infection. It can happen during a cold, the flu, or strep throat. The nodes may swell in just one area of the body, such as the neck (localized). Or nodes may swell all over the body (generalized). The neck (cervical) lymph nodes are the most common site of lymphadenopathy.  What causes  lymphadenopathy?  Dead cells and fluid build up in the lymph nodes as they help fight infection or disease. This causes them to swell in size. Enlarged lymph nodes are often near the source of infection. This can help to find the cause of an infection. For example, swollen lymph nodes around the jaw may be because of an infection in the teeth or mouth. But lymphadenopathy may also be generalized. This is common in some viral illnesses such as mononucleosis or chickenpox (varicella).  Lymphadenopathy can also be caused by:    Infection of a lymph node or small group of nodes (lymphadenitis)    Cancer    Reactions to medicines such as antibiotics and some seizure medicines    Other health conditions, such as lupus  Symptoms of lymphadenopathy  Lymphadenopathy can cause symptoms such as:    Lumps under the jaw, on the sides or back of the neck, in the armpits, in the groin, or in the chest or belly (abdomen)    Pain or tenderness in any of these areas    Redness or warmth in any of these areas  You may also have symptoms from an infection causing the swollen glands. These symptoms may include fever, sore throat, body aches, or cough.  Diagnosing lymphadenopathy  Your health care provider will ask about your health history and symptoms. He or she will give you a physical exam and check the areas where lymph nodes are enlarged. Your health care provider will check the size and location of the nodes, and ask how long they have been swollen and if they are painful. Diagnostic tests and referral to specialists may be recommended. They may include:    Blood tests. These are done to check for signs of infection and other problems.    Urine test. This is also done to check for infection and other problems.    Chest X-ray. This test can show enlarged lymph nodes or other problems.    Lymph node biopsy. If lymph nodes are swollen for 3 to 4 weeks, they may be checked with a biopsy. Small samples of lymph node tissue are taken  and checked in a lab for signs of cancer. You may be referred to a specialist in blood disorders and cancer (hematologist and oncologist).  Treatment for lymphadenopathy  The treatment of enlarged lymph nodes depends on the cause. Enlarged lymph nodes are often harmless and go away without any treatment. Treatment is most often done on the cause of the enlarged nodes and may include:    Antibiotic medicine to treat a bacterial infection    Incision and drainage (I & D) of a lymph node for lymphadenitis    Other medicines or procedures to treat the cause of the enlarged nodes  You may need follow-up exam in 3 to 4 weeks to recheck enlarged nodes.     When to call your health care provider  Call your health care provider if you have lymph nodes that are still swollen after 3 to 4 weeks.   Date Last Reviewed: 6/19/2015 2000-2016 [employer]. [address] [place] [address], [place] [address]. All rights reserved. This information is not intended as a substitute for professional medical care. Always follow your healthcare professional's instructions.           Erich Lancaster,